# Patient Record
(demographics unavailable — no encounter records)

---

## 2024-12-31 NOTE — HISTORY OF PRESENT ILLNESS
[de-identified] : 33 y.o. female with h/o asthma, thyroiditis, knee pain r/t soft tissue sarcoma.seen for f/u has multiple acute issues she wants to discuss  last seen by me 11/2022   # Problem swallowing saw NP 2/2024 - discussed trouble swallowing ch inflamation thyroid - wasd reassured - she did US neck at the time  -still the problem is ongoing - she can swallow - at times feesl closing in her throat tight feeling -  -hx Gas bloating blelching Gas - more that normal -its very frequent - + reflux  - not seen GI or no meds - no EGD  -no food getting stuck - any consistency - feels hard to swallow at times - when not swallowing feels tense in throat   # BP running low- 80/55 last week she was driving was feeling pressure in head and pain left side temple - felt dizzy confused - went to her sisters home nearby -- her sister later drove her back home  - SBP 90 -80 usually - she drinks water -10-12 glasses daily  Hx palpitations - was seen by cardio Sav[place] ragland - 11/2024 told MVP -no regurg did W/u neg Holter pending  -she has been increasing physical activity - reducing Coffee tea etc   # headache left side -pinch behing eye - pain behing eye - no flashes of light or blurry vision - Photophobia +  x 1 month -10 episodes -no headace on waking up starts after she wakes up 2-3 hrs later  -takes tylenol - q weekly if needed   # Right Upper abd pain -  feels weight on her abd uper side and back also on her right side  + constipation  -No nausea or vomiting - Loss of appetites +   # Burning sensation in skin right for arm skin feels heating up  her skin at feels on fire at that spot   hx Right knee scarcoma soft tissue dx 2/2021 S/p resection of sarcoma knee mass  (final margin pathology negative for sarcoma). Completed adjuvant radiation 7/2021 finished 8/2021 . F/u CT chest and MRI knee. 3/2021 IMPRESSION: *  No evidence of metastatic disease to the chest, abdomen, or pelvis.-was supposed to do one this year -did not follow up yet   Continues wound care f/u for nonhealing surgical site wound vs. radiation wound. - better in last 2 months

## 2024-12-31 NOTE — ASSESSMENT
[FreeTextEntry1] :  # neck Swelling Hx Hashimotos thyroiditis - Not taking Meds + s/s Constipation, Insomnia , temp issues , Weight gain , Stomach issues - discussed with pt compliance with thyroid medicvations to fix these issues  -Get TFT  Thyroid US- 4/25/24 -IMPRESSION: Diffusely heterogeneous thyroid gland consistent with chronic thyroiditis. There are no dominant or suspicious lesions.  GERD / dysphagia / RUQ discomfort  -US abd r/o fatty liver / gallbladder issues  - get stool for H.Pylori  -start omeprazole 40 po daily 30 minutes prior to breakfast 1 month trial  -Educated patient lifestyle modification, advice to avoid fried food, greasy oily and spicy foods, avoid tomato, orange, lemon , or caffeinated beverages. -Avoid reclining upto 3 hours after meals -Followup in 3 months  -GI eval for Difficulty swallowing   HEadace - tension Vs cluster  -get ESR r/o vasculitis  - adv hydration , good sleep hygeine , avodi caffeene  -Tylenol as needed  - neuro evaluation   Hx intermittent palpitations- followed by cardio  -pending holter  - adv hydration , exercise and reduce caffine   Low BP - adv compression stockings   h x Rt Knee mass, ?sarcoma:2/2021  s/p resection and adjuvant radiation comp 8/2021 Pending Follow up imaging  (chest abd pelvis CT and MRI knee)- over due - pending - pt to schedule appt ASAP  -f/u with surgery and heme-onc Will schedule f/u with ortho as well  BMI -31 --> 33  - discussed caloric control , portion control , weight loss, increase physical activity  RTC CPE

## 2025-01-07 NOTE — ASSESSMENT
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  Final Path:  - Negative for sarcoma  - previous biopsy cavity with surrounding reactive myofibroblastic proliferation, granulation tissue, fibrin and focal suture material with histiocytic reaction. -Completed adjuvant radiation 7/2021 -MR Right Knee 12/27/2021- IMPRESSION: 1) Postsurgical changes of mass resection along the anterolateral soft tissues of the knee with interval development of an overlying ulceration. There is a worsening thickened, heterogeneous rim of enhancement surrounding the surgical bed, which may be related to infection/granulation tissue. However, recurrence of the patient's neoplasm is also in the differential. Short-term interval follow-up knee MRI without and with contrast is recommended. 2) Moderate joint effusion with enhancing synovitis, which may be related to posttreatment changes or be related to septic arthritis/infection given the area of nearby overlying ulceration. 3) Worsening edema with enhancement within the superolateral aspect of Hoffa's fat pad, which may be related to patellar maltracking or less likely be related to infection. Attention on follow-up imaging is recommended to exclude neoplastic etiologies. -Right knee non healing wound - Pt. is being treated at the wound care center  wound care per Dr. Avila  GI bloating  PLAN:  CT Chest/abd/pelvis and MRI of knee due now ?? RTO q 6 months ??

## 2025-01-07 NOTE — HISTORY OF PRESENT ILLNESS
[de-identified] : Ms. TREY ELLER  is a 33 year old female here for follow up.   Pt. was seen by Dr. Gaspar in 1/2021 with c/o chronic right knee pain x 2 years without trauma, associated with an enlarging mass. Surgery was delayed due to testing positive for COVID. MRI Right knee 12/31/2020 (Imp: Enhancing lobulated soft tissue mass is present along the superficial margin of the lateral patellar retinaculum at the level of the lateral femoral condyle measuring approximately 2.2 x 1.3 x 3.0 cm.).    Trey is now s/p right knee mass open biopsy with frozen section, followed by excision under the care of Dr. Gaspar on 2/25/2021.  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  There is scattered single cell aptosis but no urszula necrosis.  The lesional cells extend to the inked margins of excision in multiple foci.  Ki67 proliferation rate is variable and is focally up to 30%.   Past medical history notable for mild asthma, hypothyroidism, eczema, goiter.  Family history includes maternal grandfather with lung cancer.  She is , never a smoker, denies alcohol use.    CT Chest/Abdomen/Pelvis 3/15/21: No evidence of metastatic disease to the chest, abdomen, or pelvis.  She is status post radical resection of sarcoma of right knee performed on 4/7/21. Final pathology was negative for sarcoma and demonstrated the previous biopsy cavity with surrounding reactive myofibroblastic proliferation, granulation tissue, fibrin and focal suture material with histiocytic reaction.  MRI of the right knee performed on 5/19/21 revealed peripherally enhancing fluid collection is present at the surgical site with multiple thin septations measuring approx. 7.0 x 1.3 x 4.6 cm. Postoperative seroma or hematoma with surrounding granulation tissue is likely. Correlation is suggested for signs of infection. Small popliteal lymph node is slightly increased in size from the previous exam. This may be reactive given the postsurgical changes although close observation is suggested to exclude malignant involvement. There is new edema and enhancement involving the superolateral margin of Hoffa's fat pad near the site of surgery. This may be reactive or reflect fat pad impingement. neoplastic involvement is thought to be les likely. Mild synovial enhancement may also be related tot eh nearby postoperative changes. Enhancing cutaneous nodularity at the incision site could reflect hypertropic scarring or keloid formation. Residual disease cannot be absolutely excluded. Continued MRI surveillance is suggested to assess stability of these findings and to exclude residual or recurrent disease.   Patient completed adjuvant radiation therapy with Dr. Tomlin on 7/30/21.   Pending a CT Chest (awaiting authorization).   MR Right Knee 12/27/2021- IMPRESSION: 1) Postsurgical changes of mass resection along the anterolateral soft tissues of the knee with interval development of an overlying ulceration. There is a worsening thickened, heterogeneous rim of enhancement surrounding the surgical bed, which may be related to infection/granulation tissue. However, recurrence of the patient's neoplasm is also in the differential. Short-term interval follow-up knee MRI without and with contrast is recommended. 2) Moderate joint effusion with enhancing synovitis, which may be related to posttreatment changes or be related to septic arthritis/infection given the area of nearby overlying ulceration. 3) Worsening edema with enhancement within the superolateral aspect of Hoffa's fat pad, which may be related to patellar maltracking or less likely be related to infection. Attention on follow-up imaging is recommended to exclude neoplastic etiologies.  Pt. states she developed a right knee infection at the end of 11/2021 after traveling to [facility].  The wound opened and has not healed since.  She is being treated at the wound care center, under the care of Dr. Avila.  She states it is slowly healing.  Denies pain, fever or chills.   CTscan???

## 2025-07-21 NOTE — PHYSICAL EXAM
[Normal] : supple, no neck mass and thyroid not enlarged [Normal Groin Lymph Nodes] : normal groin lymph nodes [Normal] : oriented to person, place and time, with appropriate affect [de-identified] : Normal popliteal lymph nodes [de-identified] : Right knee without local recurrence; no lower leg swelling; varicose veins right lower leg

## 2025-07-21 NOTE — HISTORY OF PRESENT ILLNESS
[de-identified] : Ms. TREY ELLER  is a 33 year old female here for follow up.   Pt. was seen by Dr. Gaspar in 1/2021 with c/o chronic right knee pain x 2 years without trauma, associated with an enlarging mass. Surgery was delayed due to testing positive for COVID. MRI Right knee 12/31/2020 (Imp: Enhancing lobulated soft tissue mass is present along the superficial margin of the lateral patellar retinaculum at the level of the lateral femoral condyle measuring approximately 2.2 x 1.3 x 3.0 cm.).    Trey is now s/p right knee mass open biopsy with frozen section, followed by excision under the care of Dr. Gaspar on 2/25/2021.  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  There is scattered single cell aptosis but no urszula necrosis.  The lesional cells extend to the inked margins of excision in multiple foci.  Ki67 proliferation rate is variable and is focally up to 30%.   Past medical history notable for mild asthma, hypothyroidism, eczema, goiter.  Family history includes maternal grandfather with lung cancer.  She is , never a smoker, denies alcohol use.    CT Chest/Abdomen/Pelvis 3/15/21: No evidence of metastatic disease to the chest, abdomen, or pelvis.  She is status post radical resection of sarcoma of right knee performed on 4/7/21. Final pathology was negative for sarcoma and demonstrated the previous biopsy cavity with surrounding reactive myofibroblastic proliferation, granulation tissue, fibrin and focal suture material with histiocytic reaction.  MRI of the right knee performed on 5/19/21 revealed peripherally enhancing fluid collection is present at the surgical site with multiple thin septations measuring approx. 7.0 x 1.3 x 4.6 cm. Postoperative seroma or hematoma with surrounding granulation tissue is likely. Correlation is suggested for signs of infection. Small popliteal lymph node is slightly increased in size from the previous exam. This may be reactive given the postsurgical changes although close observation is suggested to exclude malignant involvement. There is new edema and enhancement involving the superolateral margin of Hoffa's fat pad near the site of surgery. This may be reactive or reflect fat pad impingement. neoplastic involvement is thought to be les likely. Mild synovial enhancement may also be related tot eh nearby postoperative changes. Enhancing cutaneous nodularity at the incision site could reflect hypertropic scarring or keloid formation. Residual disease cannot be absolutely excluded. Continued MRI surveillance is suggested to assess stability of these findings and to exclude residual or recurrent disease.   Patient completed adjuvant radiation therapy with Dr. Tomlin on 7/30/21.   Pending a CT Chest (awaiting authorization).   MR Right Knee 12/27/2021- IMPRESSION: 1) Postsurgical changes of mass resection along the anterolateral soft tissues of the knee with interval development of an overlying ulceration. There is a worsening thickened, heterogeneous rim of enhancement surrounding the surgical bed, which may be related to infection/granulation tissue. However, recurrence of the patient's neoplasm is also in the differential. Short-term interval follow-up knee MRI without and with contrast is recommended. 2) Moderate joint effusion with enhancing synovitis, which may be related to posttreatment changes or be related to septic arthritis/infection given the area of nearby overlying ulceration. 3) Worsening edema with enhancement within the superolateral aspect of Hoffa's fat pad, which may be related to patellar maltracking or less likely be related to infection. Attention on follow-up imaging is recommended to exclude neoplastic etiologies.  Pt. states she developed a right knee infection at the end of 11/2021 after traveling to [facility].  The wound opened and has not healed since.  She is being treated at the wound care center, under the care of Dr. Avila.  She states it is slowly healing.  Denies pain, fever or chills.   CT chest 1/2025 - normal  Right knee MRI 2/2025 - surgical changes to soft tissue s/p excision, subtle soft tissue edema & enhancement within surgical bed felt to be r/t granulation tissue/surgical changes, decreased compared to prior, no masslike enhancement to suggest recurrence

## 2025-07-21 NOTE — HISTORY OF PRESENT ILLNESS
[de-identified] : Ms. TREY ELLER  is a 33 year old female here for follow up.   Pt. was seen by Dr. Gaspar in 1/2021 with c/o chronic right knee pain x 2 years without trauma, associated with an enlarging mass. Surgery was delayed due to testing positive for COVID. MRI Right knee 12/31/2020 (Imp: Enhancing lobulated soft tissue mass is present along the superficial margin of the lateral patellar retinaculum at the level of the lateral femoral condyle measuring approximately 2.2 x 1.3 x 3.0 cm.).    Trey is now s/p right knee mass open biopsy with frozen section, followed by excision under the care of Dr. Gaspar on 2/25/2021.  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  There is scattered single cell aptosis but no urszula necrosis.  The lesional cells extend to the inked margins of excision in multiple foci.  Ki67 proliferation rate is variable and is focally up to 30%.   Past medical history notable for mild asthma, hypothyroidism, eczema, goiter.  Family history includes maternal grandfather with lung cancer.  She is , never a smoker, denies alcohol use.    CT Chest/Abdomen/Pelvis 3/15/21: No evidence of metastatic disease to the chest, abdomen, or pelvis.  She is status post radical resection of sarcoma of right knee performed on 4/7/21. Final pathology was negative for sarcoma and demonstrated the previous biopsy cavity with surrounding reactive myofibroblastic proliferation, granulation tissue, fibrin and focal suture material with histiocytic reaction.  MRI of the right knee performed on 5/19/21 revealed peripherally enhancing fluid collection is present at the surgical site with multiple thin septations measuring approx. 7.0 x 1.3 x 4.6 cm. Postoperative seroma or hematoma with surrounding granulation tissue is likely. Correlation is suggested for signs of infection. Small popliteal lymph node is slightly increased in size from the previous exam. This may be reactive given the postsurgical changes although close observation is suggested to exclude malignant involvement. There is new edema and enhancement involving the superolateral margin of Hoffa's fat pad near the site of surgery. This may be reactive or reflect fat pad impingement. neoplastic involvement is thought to be les likely. Mild synovial enhancement may also be related tot eh nearby postoperative changes. Enhancing cutaneous nodularity at the incision site could reflect hypertropic scarring or keloid formation. Residual disease cannot be absolutely excluded. Continued MRI surveillance is suggested to assess stability of these findings and to exclude residual or recurrent disease.   Patient completed adjuvant radiation therapy with Dr. Tomlin on 7/30/21.   Pending a CT Chest (awaiting authorization).   MR Right Knee 12/27/2021- IMPRESSION: 1) Postsurgical changes of mass resection along the anterolateral soft tissues of the knee with interval development of an overlying ulceration. There is a worsening thickened, heterogeneous rim of enhancement surrounding the surgical bed, which may be related to infection/granulation tissue. However, recurrence of the patient's neoplasm is also in the differential. Short-term interval follow-up knee MRI without and with contrast is recommended. 2) Moderate joint effusion with enhancing synovitis, which may be related to posttreatment changes or be related to septic arthritis/infection given the area of nearby overlying ulceration. 3) Worsening edema with enhancement within the superolateral aspect of Hoffa's fat pad, which may be related to patellar maltracking or less likely be related to infection. Attention on follow-up imaging is recommended to exclude neoplastic etiologies.  Pt. states she developed a right knee infection at the end of 11/2021 after traveling to [facility].  The wound opened and has not healed since.  She is being treated at the wound care center, under the care of Dr. Avila.  She states it is slowly healing.  Denies pain, fever or chills.   CT chest 1/2025 - normal  Right knee MRI 2/2025 - surgical changes to soft tissue s/p excision, subtle soft tissue edema & enhancement within surgical bed felt to be r/t granulation tissue/surgical changes, decreased compared to prior, no masslike enhancement to suggest recurrence

## 2025-07-21 NOTE — ASSESSMENT
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  Final Path: - Negative for sarcoma   Completed adjuvant radiation 7/2021 -Right knee non healing wound - Pt. is being treated at the wound care center  wound care per Dr. Avila  CT chest 1/2025 - normal  Right knee MRI 2/2025 - surgical changes to soft tissue s/p excision, subtle soft tissue edema & enhancement within surgical bed felt to be r/t granulation tissue/surgical changes, decreased compared to prior, no masslike enhancement to suggest recurrence - varicose veins right leg are collaterals - do not remove PLAN: CT chest & RLE MRI 1/2026 then PRN  RTO 6 months then yearly

## 2025-07-21 NOTE — PHYSICAL EXAM
[Normal] : supple, no neck mass and thyroid not enlarged [Normal Groin Lymph Nodes] : normal groin lymph nodes [Normal] : oriented to person, place and time, with appropriate affect [de-identified] : Normal popliteal lymph nodes [de-identified] : Right knee without local recurrence; no lower leg swelling; varicose veins right lower leg